# Patient Record
Sex: MALE | Race: WHITE | NOT HISPANIC OR LATINO | Employment: FULL TIME | URBAN - METROPOLITAN AREA
[De-identification: names, ages, dates, MRNs, and addresses within clinical notes are randomized per-mention and may not be internally consistent; named-entity substitution may affect disease eponyms.]

---

## 2017-01-11 ENCOUNTER — ALLSCRIPTS OFFICE VISIT (OUTPATIENT)
Dept: OTHER | Facility: OTHER | Age: 47
End: 2017-01-11

## 2017-05-30 ENCOUNTER — ALLSCRIPTS OFFICE VISIT (OUTPATIENT)
Dept: OTHER | Facility: OTHER | Age: 47
End: 2017-05-30

## 2017-06-09 ENCOUNTER — LAB CONVERSION - ENCOUNTER (OUTPATIENT)
Dept: OTHER | Facility: OTHER | Age: 47
End: 2017-06-09

## 2017-06-09 LAB
CHOLEST SERPL-MCNC: 187 MG/DL (ref 125–200)
CHOLEST/HDLC SERPL: 5.3 (CALC)
CREATININE, RANDOM URINE (HISTORICAL): 187 MG/DL (ref 20–370)
HBA1C MFR BLD HPLC: 6.3 % OF TOTAL HGB
HDLC SERPL-MCNC: 35 MG/DL
LDL CHOLESTEROL (HISTORICAL): 116 MG/DL (CALC)
MAGNESIUM, UR (HISTORICAL): 1.2 MG/DL
MICROALBUMIN/CREATININE RATIO (HISTORICAL): 6 MCG/MG CREAT
NON-HDL-CHOL (CHOL-HDL) (HISTORICAL): 152 MG/DL (CALC)
TRIGL SERPL-MCNC: 182 MG/DL
TSH SERPL DL<=0.05 MIU/L-ACNC: 1.11 MIU/L (ref 0.4–4.5)

## 2017-06-14 ENCOUNTER — ALLSCRIPTS OFFICE VISIT (OUTPATIENT)
Dept: OTHER | Facility: OTHER | Age: 47
End: 2017-06-14

## 2017-08-31 ENCOUNTER — GENERIC CONVERSION - ENCOUNTER (OUTPATIENT)
Dept: OTHER | Facility: OTHER | Age: 47
End: 2017-08-31

## 2017-09-21 ENCOUNTER — ALLSCRIPTS OFFICE VISIT (OUTPATIENT)
Dept: OTHER | Facility: OTHER | Age: 47
End: 2017-09-21

## 2017-12-11 ENCOUNTER — LAB CONVERSION - ENCOUNTER (OUTPATIENT)
Dept: OTHER | Facility: OTHER | Age: 47
End: 2017-12-11

## 2017-12-11 LAB
A/G RATIO (HISTORICAL): 1.3 (CALC) (ref 1–2.5)
ALBUMIN SERPL BCP-MCNC: 4 G/DL (ref 3.6–5.1)
ALP SERPL-CCNC: 71 U/L (ref 40–115)
ALT SERPL W P-5'-P-CCNC: 53 U/L (ref 9–46)
AST SERPL W P-5'-P-CCNC: 27 U/L (ref 10–40)
BASOPHILS # BLD AUTO: 0.5 %
BASOPHILS # BLD AUTO: 32 CELLS/UL (ref 0–200)
BILIRUB SERPL-MCNC: 0.9 MG/DL (ref 0.2–1.2)
BUN SERPL-MCNC: 11 MG/DL (ref 7–25)
BUN/CREA RATIO (HISTORICAL): ABNORMAL (CALC) (ref 6–22)
CALCIUM SERPL-MCNC: 9.2 MG/DL (ref 8.6–10.3)
CHLORIDE SERPL-SCNC: 99 MMOL/L (ref 98–110)
CHOLEST SERPL-MCNC: 184 MG/DL
CHOLEST/HDLC SERPL: 4.7 (CALC)
CO2 SERPL-SCNC: 30 MMOL/L (ref 20–31)
CREAT SERPL-MCNC: 0.8 MG/DL (ref 0.6–1.35)
DEPRECATED RDW RBC AUTO: 11.9 % (ref 11–15)
EGFR AFRICAN AMERICAN (HISTORICAL): 123 ML/MIN/1.73M2
EGFR-AMERICAN CALC (HISTORICAL): 106 ML/MIN/1.73M2
EOSINOPHIL # BLD AUTO: 290 CELLS/UL (ref 15–500)
EOSINOPHIL # BLD AUTO: 4.6 %
GAMMA GLOBULIN (HISTORICAL): 3.1 G/DL (CALC) (ref 1.9–3.7)
GLUCOSE (HISTORICAL): 158 MG/DL (ref 65–99)
HBA1C MFR BLD HPLC: 6.7 % OF TOTAL HGB
HCT VFR BLD AUTO: 46.5 % (ref 38.5–50)
HDLC SERPL-MCNC: 39 MG/DL
HGB BLD-MCNC: 16 G/DL (ref 13.2–17.1)
LDL CHOLESTEROL (HISTORICAL): 123 MG/DL (CALC)
LYMPHOCYTES # BLD AUTO: 1499 CELLS/UL (ref 850–3900)
LYMPHOCYTES # BLD AUTO: 23.8 %
MCH RBC QN AUTO: 31.3 PG (ref 27–33)
MCHC RBC AUTO-ENTMCNC: 34.4 G/DL (ref 32–36)
MCV RBC AUTO: 90.8 FL (ref 80–100)
MONOCYTES # BLD AUTO: 580 CELLS/UL (ref 200–950)
MONOCYTES (HISTORICAL): 9.2 %
NEUTROPHILS # BLD AUTO: 3900 CELLS/UL (ref 1500–7800)
NEUTROPHILS # BLD AUTO: 61.9 %
NON-HDL-CHOL (CHOL-HDL) (HISTORICAL): 145 MG/DL (CALC)
PLATELET # BLD AUTO: 248 THOUSAND/UL (ref 140–400)
PMV BLD AUTO: 9.6 FL (ref 7.5–12.5)
POTASSIUM SERPL-SCNC: 4 MMOL/L (ref 3.5–5.3)
PROSTATE SPECIFIC ANTIGEN TOTAL (HISTORICAL): 0.5 NG/ML
RBC # BLD AUTO: 5.12 MILLION/UL (ref 4.2–5.8)
SODIUM SERPL-SCNC: 136 MMOL/L (ref 135–146)
TOTAL PROTEIN (HISTORICAL): 7.1 G/DL (ref 6.1–8.1)
TRIGL SERPL-MCNC: 113 MG/DL
TSH SERPL DL<=0.05 MIU/L-ACNC: 1.55 MIU/L (ref 0.4–4.5)
WBC # BLD AUTO: 6.3 THOUSAND/UL (ref 3.8–10.8)

## 2017-12-14 DIAGNOSIS — E11.9 TYPE 2 DIABETES MELLITUS WITHOUT COMPLICATIONS (HCC): ICD-10-CM

## 2017-12-14 DIAGNOSIS — E78.5 HYPERLIPIDEMIA: ICD-10-CM

## 2017-12-14 DIAGNOSIS — J45.30 MILD PERSISTENT ASTHMA, UNCOMPLICATED: ICD-10-CM

## 2017-12-14 DIAGNOSIS — Z12.5 ENCOUNTER FOR SCREENING FOR MALIGNANT NEOPLASM OF PROSTATE: ICD-10-CM

## 2017-12-14 DIAGNOSIS — I10 ESSENTIAL (PRIMARY) HYPERTENSION: ICD-10-CM

## 2017-12-18 ENCOUNTER — ALLSCRIPTS OFFICE VISIT (OUTPATIENT)
Dept: OTHER | Facility: OTHER | Age: 47
End: 2017-12-18

## 2017-12-20 NOTE — PROGRESS NOTES
Assessment  1  Type 2 diabetes mellitus (250 00) (E11 9)   2  Hypertension (401 9) (I10)   3  Hyperlipidemia (272 4) (E78 5)    Plan  Hyperlipidemia, Hypertension, Type 2 diabetes mellitus    · (1) CBC/PLT/DIFF; Status:Active; Requested KPC:49JCW8821;    · (1) COMPREHENSIVE METABOLIC PANEL; Status:Active; Requested DFF:67UVI0994;    · (1) HEMOGLOBIN A1C; Status:Active; Requested PVK:66RWH4696;    · (1) LIPID PANEL FASTING W DIRECT LDL REFLEX; Status:Active; Requestedfor:18Jun2018;    · (1) MICROALBUMIN CREATININE RATIO, RANDOM URINE; Status:Active; Requestedfor:18Jun2018;    · (1) TSH WITH FT4 REFLEX; Status:Active; Requested MSO:74XYG1080;   Screening for diabetic retinopathy    · *VB - Eye Exam ; every 1 year; Last 01HSV7021; Next 42ZQN7193; Status:Active  Type 2 diabetes mellitus    · *VB - Foot Exam ; every 1 year; Last 47BTV9502; Next 28OGS2780; Status:Active    Discussion/Summary    1  Type 2 diabetes-presently stable with hemoglobin A1c of 6 7 however this has gone up from 6 3 last visit  We did discuss medication options and diet  If he continues to climb in the next 6 months he will be started on metformin therapy  2  Hypertension-presently stable on losartan with hydrochlorothiazide  His last several readings in the office have been in 120s over 80s  He was advised to keep a home log and if elevated contact me and we will initiate Norvasc for him  3  Hyperlipidemia-cholesterol numbers were reviewed and stable with diet control  Health maintenance-flu vaccine was declined by patient  Chief Complaint  pt is here for follow up of hyperlipidemia and hypertension   he also has blood work to review   cd      History of Present Illness  This is a 51-year-old gentleman that presents to the office for follow-up of chronic health conditions including type 2 diabetes, hypertension, and increased lipids  He has been feeling well without any acute complaints   He also continues to follow with allergist and has gone through with allergy injections  He does state that his allergist felt that his blood pressure was elevated recently at 130/80 and gave him a prescription for amlodipine 10 mg daily  He has not started taking it and is hesitant to have it filled  He is currently taking losartan with hydrochlorothiazide for his blood pressure  His diabetes has been diet controlled  Review of Systems   Constitutional: no fever,-- not feeling poorly,-- no chills-- and-- not feeling tired  Eyes: no eyesight problems-- and-- no purulent discharge from the eyes  ENT: no complaints of earache, no hearing loss, no nosebleeds, no nasal discharge, no sore throat, no hoarseness,-- no nosebleeds-- and-- no nasal discharge  Cardiovascular: the heart rate was not slow  Respiratory: no shortness of breath,-- no cough,-- no wheezing-- and-- no shortness of breath during exertion  Gastrointestinal: no abdominal pain-- and-- no constipation  Musculoskeletal: no arthralgias-- and-- no myalgias  Neurological: no headache  Hematologic/Lymphatic: no swollen glands-- and-- no swollen glands in the neck  Active Problems  1  Acute non-recurrent frontal sinusitis (461 1) (J01 10)   2  Allergic rhinitis (477 9) (J30 9)   3  Anxiety disorder (300 00) (F41 9)   4  Conjunctivitis (372 30) (H10 9)   5  Cough (786 2) (R05)   6  Depression (311) (F32 9)   7  Encounter for prostate cancer screening (V76 44) (Z12 5)   8  Eye problem (V41 1) (H57 9)   9  Hyperlipidemia (272 4) (E78 5)   10  Hypertension (401 9) (I10)   11  Mild persistent asthma (493 90) (J45 30)   12  Screening for diabetic retinopathy (V80 2) (Z13 5)   13  Sinusitis (473 9) (J32 9)   14  Type 2 diabetes mellitus (250 00) (E11 9)    Past Medical History    The active problems and past medical history were reviewed and updated today  Family History  Father    1  Family history of Coronary Artery Disease (V17 49)   2   Family history of Myocardial Infarction Arrhythmias  Family History    3  No family history of mental disorder    Social History   · Being A Social Drinker   · Denied: History of Drug Use   · Former smoker (V15 82) (I85 268)   · Marital History - Currently    · No drug use   · No secondhand smoke exposure (V49 89) (Z78 9)   · Occupation:    Current Meds   1  Breo Ellipta 100-25 MCG/INH Inhalation Aerosol Powder Breath Activated; ONE INHALATION DAILY  AFTER INHALATION RINSE MOUTH WITH WATER & SPIT  USE SAME TIME EACH DAY, NO MORE THAN 1 TIME IN 24 HOURS; Therapy: 53NCQ8915 to (Last Rx:12Jsz7907) Ordered   2  Claritin 10 MG Oral Capsule; Therapy: 97BWG5222 to Recorded   3  Fluticasone Propionate 50 MCG/ACT Nasal Suspension; USE 2 SPRAYS INTO EACH NOSTRIL EVERY DAY; Therapy: 36ECP5705 to (Evaluate:19Jan2018)  Requested for: 49SYQ2367; Last Rx:13Xwi9754 Ordered   4  Losartan Potassium-HCTZ 100-25 MG Oral Tablet; take 1 tablet by mouth every day; Therapy: 06HRX8988 to (Evaluate:09Nkf0491)  Requested for: 02UNC3400; Last Rx:02Pei1653 Ordered   5  Singulair 10 MG Oral Tablet; Therapy: 81YTP5139 to (Last Rx:75Mea5352)  Requested for: 80PBW5207 Ordered    The medication list was reviewed and updated today  Allergies  1  Nasonex SUSP    Vitals  Vital Signs    Recorded: 33NPC2220 05:52PM   Heart Rate 76, L Radial   Pulse Quality Regular, L Radial   Systolic 447, LUE, Sitting   Diastolic 78, LUE, Sitting   Height 6 ft 1 8 in   Weight 247 lb    BMI Calculated 31 89   BSA Calculated 2 37     Physical Exam   Constitutional  General appearance: No acute distress, well appearing and well nourished  Eyes  Conjunctiva and lids: No swelling, erythema, or discharge  Pupils and irises: Equal, round and reactive to light  Ears, Nose, Mouth, and Throat  External inspection of ears and nose: Normal    Otoscopic examination: Tympanic membrance translucent with normal light reflex  Canals patent without erythema     Nasal mucosa, septum, and turbinates: Normal without edema or erythema  Oropharynx: Normal with no erythema, edema, exudate or lesions  Pulmonary  Respiratory effort: No increased work of breathing or signs of respiratory distress  Auscultation of lungs: Clear to auscultation, equal breath sounds bilaterally, no wheezes, no rales, no rhonci  Cardiovascular  Auscultation of heart: Normal rate and rhythm, normal S1 and S2, without murmurs  Examination of extremities for edema and/or varicosities: Normal    Abdomen  Abdomen: Non-tender, no masses  Liver and spleen: No hepatomegaly or splenomegaly  Lymphatic  Palpation of lymph nodes in neck: No lymphadenopathy  Musculoskeletal  Gait and station: Normal    Digits and nails: Normal without clubbing or cyanosis  Neurologic  Reflexes: 2+ and symmetric  Sensation: No sensory loss  Psychiatric  Orientation to person, place and time: Normal    Mood and affect: Normal          Results/Data  PHQ-9 Adult Depression Screening 35JOC5375 05:58PM User, s     Test Name Result Flag Reference   PHQ-9 Adult Depression Score 0     Over the last two weeks, how often have you been bothered by any of the following problems? Little interest or pleasure in doing things: Not at all - 0 Feeling down, depressed, or hopeless: Not at all - 0 Trouble falling or staying asleep, or sleeping too much: Not at all - 0 Feeling tired or having little energy: Not at all - 0 Poor appetite or over eating: Not at all - 0 Feeling bad about yourself - or that you are a failure or have let yourself or your family down: Not at all - 0 Trouble concentrating on things, such as reading the newspaper or watching television: Not at all - 0 Moving or speaking so slowly that other people could have noticed   Or the opposite -  being so fidgety or restless that you have been moving around a lot more than usual: Not at all - 0 Thoughts that you would be better off dead, or of hurting yourself in some way: Not at all - 0   PHQ-9 Adult Depression Screening Negative     PHQ-9 Difficulty Level Not difficult at all     PHQ-9 Severity No Depression       PHQ-2 Adult Depression Screening 98KWF4517 05:58PM User, Chelita     Test Name Result Flag Reference   PHQ-2 Adult Depression Score 0     Over the last two weeks, how often have you been bothered by any of the following problems? Little interest or pleasure in doing things: Not at all - 0 Feeling down, depressed, or hopeless: Not at all - 0   PHQ-2 Adult Depression Screening Negative       Diabetes Flow Sheet 71TVQ1837 05:57PM      Test Name Result Flag Reference   Foot Exam 06DGY4182         Health Management  Screening for diabetic retinopathy   *VB - Eye Exam; every 1 year; Last 68Cvv2877; Next Due: 23NYO5854; Active  Type 2 diabetes mellitus   (1) HEMOGLOBIN A1C; every 6 months; Last 18HCL1290; Next Due: 35WYR1406; Active  (1) MICROALBUMIN CREATININE RATIO, RANDOM URINE; every 1 year; Last 27ZHB5824; Next IYQ:33MTW4507; Active  *VB - Foot Exam; every 1 year; Last 95KKH6269; Next Due: 57MKX4512;  Overdue    Signatures   Electronically signed by : Rober Reyes, [facility]; Dec 18 2017  6:23PM EST                       (Author)    Electronically signed by : Chad Everett DO; Dec 19 2017  7:19AM EST

## 2018-01-09 NOTE — PROGRESS NOTES
Chief Complaint  PT PRESENTS FOR ALLERGY INJECTION PER DR MCDOWELL'S OFFICE  0 6CC OF ALLERGY EXTRACT WAS ADMINISTERED SUBCUTANEOUSLY TO POSTERIOR UPPER (R) ARM  TOLERATED WITHOUT DIFFICULTY  AFTER 20 MINUTES NO REACTION WAS NOTED AND PT LEFT THE OFFICE IN NO DISTRESS --BB      Active Problems    1  Allergic rhinitis (477 9) (J30 9)   2  Anxiety disorder (300 00) (F41 9)   3  Conjunctivitis (372 30) (H10 9)   4  Cough (786 2) (R05)   5  Depression (311) (F32 9)   6  Encounter for prostate cancer screening (V76 44) (Z12 5)   7  Eye problem (V41 1) (H57 9)   8  Hyperlipidemia (272 4) (E78 5)   9  Hypertension (401 9) (I10)   10  Mild persistent asthma (493 90) (J45 30)   11  Screening for diabetic retinopathy (V80 2) (Z13 5)   12  Type 2 diabetes mellitus (250 00) (E11 9)    Current Meds   1  Advair Diskus 250-50 MCG/DOSE Inhalation Aerosol Powder Breath Activated; inhale 1   puff twice daily; Therapy: 39Pzp5639 to (Last Rx:95Wje3667)  Requested for: 23Rnu0484 Ordered   2  Breo Ellipta 100-25 MCG/INH Inhalation Aerosol Powder Breath Activated; ONE   INHALATION DAILY  AFTER INHALATION RINSE MOUTH WITH WATER & SPIT  USE   SAME TIME EACH DAY, NO MORE THAN 1 TIME IN 24 HOURS; Therapy: 70EYM1133 to (Last Rx:23May2016) Ordered   3  Fluticasone Propionate 50 MCG/ACT Nasal Suspension; USE 2 SPRAYS INTO EACH   NOSTRIL EVERY DAY; Therapy: 09QUO5179 to (Mack [place]ar[place])  Requested for: 38KTO0197; Last   Rx:23May2016 Ordered   4  Losartan Potassium-HCTZ 100-25 MG Oral Tablet (Hyzaar); TAKE 1 TABLET BY MOUTH   EVERY DAY; Therapy: 56FHG0580 to (Evaluate:77Wym4856)  Requested for: 08TDX8481; Last   Rx:23May2016 Ordered   5  Singulair 10 MG Oral Tablet (Montelukast Sodium); Therapy: 23HVW5760 to (Last Rx:28Orc6123)  Requested for: 80SPC3225 Ordered   6  Sulfacetamide Sodium 10 % Ophthalmic Solution; 1 - 2 drops in each eye 4 times a day; Therapy: 88CHU9577 to (Last Rx:17Jun2016)  Requested for: 25SFC7515 Ordered   7  Tobramycin 0 3 % Ophthalmic Solution; INSTILL 1 DROP INTO AFFECTED EYE(S) 4   TIMES DAILY; Therapy: 87RAJ8476 to (Last Rx:06Jun2016)  Requested for: 06Jun2016 Ordered    Allergies    1  Nasonex SUSP    Results/Data  Allergy Injection Flow Sheet 64ZGS7979 05:42PM [place]     Test Name Result Flag Reference   Allergy Injection #1      VIAL "G-RW-M(7)-MM" 0 6CC   Allergy Injection #1 Site      POSTERIOR UPPER (R) ARM   Allergy Inj #1 Reaction NONE         Plan  Allergic rhinitis    · Allergy Injection Flow Sheet; Status:Complete - Retrospective By Protocol Authorization;    Done: 25OPQ7137 05:42PM    Future Appointments    Date/Time Provider Specialty Site   11/28/2016 06:00 PM Sven Richards [facility]     Signatures   Electronically signed by : Dusty Garcia, ; Jul 13 2016  5:44PM EST                       (Author)    Electronically signed by :  CALLUM Clay ; Jul 18 2016  8:12AM EST

## 2018-01-09 NOTE — PROGRESS NOTES
Chief Complaint  Pt  here for allergy injection  0 60 cc of G-RW-M7) -MM given to pt  in left upper arm  Pt  handled procedure well  Area checked after 20 min, no reaction noted and pt  left in no distress  kck      Active Problems    1  Allergic rhinitis (477 9) (J30 9)   2  Anxiety disorder (300 00) (F41 9)   3  Conjunctivitis (372 30) (H10 9)   4  Cough (786 2) (R05)   5  Depression (311) (F32 9)   6  Encounter for prostate cancer screening (V76 44) (Z12 5)   7  Eye problem (V41 1) (H57 9)   8  Hyperlipidemia (272 4) (E78 5)   9  Hypertension (401 9) (I10)   10  Mild persistent asthma (493 90) (J45 30)   11  Screening for diabetic retinopathy (V80 2) (Z13 5)   12  Type 2 diabetes mellitus (250 00) (E11 9)    Current Meds   1  Advair Diskus 250-50 MCG/DOSE Inhalation Aerosol Powder Breath Activated; inhale 1   puff twice daily; Therapy: 71Uss6302 to (Last Rx:44Ywd2197)  Requested for: 62Yvi8475 Ordered   2  Breo Ellipta 100-25 MCG/INH Inhalation Aerosol Powder Breath Activated; ONE   INHALATION DAILY  AFTER INHALATION RINSE MOUTH WITH WATER & SPIT  USE   SAME TIME EACH DAY, NO MORE THAN 1 TIME IN 24 HOURS; Therapy: 85FDJ2808 to (Last Rx:23May2016) Ordered   3  Fluticasone Propionate 50 MCG/ACT Nasal Suspension; USE 2 SPRAYS INTO EACH   NOSTRIL EVERY DAY; Therapy: 88KWW0676 to (Sary Gómez)  Requested for: 07HPQ7874; Last   Rx:12Tpc2744 Ordered   4  Losartan Potassium-HCTZ 100-25 MG Oral Tablet; TAKE 1 TABLET BY MOUTH EVERY   DAY; Therapy: 74ONN0720 to (Evaluate:46Gww0121)  Requested for: 87RWE7666; Last   Rx:43Xld2988 Ordered   5  Singulair 10 MG Oral Tablet; Therapy: 23QKZ0318 to (Last Rx:16May2011)  Requested for: 20KDH8139 Ordered   6  Sulfacetamide Sodium 10 % Ophthalmic Solution; 1 - 2 drops in each eye 4 times a day; Therapy: 69QWX4256 to (Last Rx:42Enl6957)  Requested for: 64CMV5885 Ordered   7   Tobramycin 0 3 % Ophthalmic Solution; INSTILL 1 DROP INTO AFFECTED EYE(S) 4   TIMES DAILY; Therapy: 43LJA1122 to (Last Rx:06Jun2016)  Requested for: 06Jun2016 Ordered    Allergies    1   Nasonex SUSP    Plan  Allergic rhinitis    · Allergy Injection single injection; Status:Active - Perform Order; Requested  [facility]:76VPF9431;     Future Appointments    Date/Time Provider Specialty Site   11/28/2016 06:00 PM Cristino Galicia [facility]     Signatures   Electronically signed by : Lady Lam MD; Sep 11 2016  4:10PM EST                       (Author)

## 2018-01-12 VITALS
SYSTOLIC BLOOD PRESSURE: 118 MMHG | RESPIRATION RATE: 16 BRPM | HEART RATE: 76 BPM | HEIGHT: 74 IN | DIASTOLIC BLOOD PRESSURE: 72 MMHG | WEIGHT: 248.38 LBS | BODY MASS INDEX: 31.88 KG/M2

## 2018-01-14 VITALS
DIASTOLIC BLOOD PRESSURE: 66 MMHG | BODY MASS INDEX: 31.65 KG/M2 | WEIGHT: 246.6 LBS | HEIGHT: 74 IN | SYSTOLIC BLOOD PRESSURE: 120 MMHG | TEMPERATURE: 97.9 F | HEART RATE: 96 BPM

## 2018-01-22 VITALS
DIASTOLIC BLOOD PRESSURE: 78 MMHG | SYSTOLIC BLOOD PRESSURE: 130 MMHG | BODY MASS INDEX: 31.7 KG/M2 | HEIGHT: 74 IN | WEIGHT: 247 LBS | HEART RATE: 76 BPM

## 2018-02-08 ENCOUNTER — CLINICAL SUPPORT (OUTPATIENT)
Dept: FAMILY MEDICINE CLINIC | Facility: CLINIC | Age: 48
End: 2018-02-08
Payer: COMMERCIAL

## 2018-02-08 DIAGNOSIS — J30.9 ALLERGIC RHINITIS, UNSPECIFIED CHRONICITY, UNSPECIFIED SEASONALITY, UNSPECIFIED TRIGGER: ICD-10-CM

## 2018-02-08 PROCEDURE — 95115 IMMUNOTHERAPY ONE INJECTION: CPT | Performed by: FAMILY MEDICINE

## 2018-03-08 ENCOUNTER — CLINICAL SUPPORT (OUTPATIENT)
Dept: FAMILY MEDICINE CLINIC | Facility: CLINIC | Age: 48
End: 2018-03-08
Payer: COMMERCIAL

## 2018-03-08 DIAGNOSIS — J30.9 ALLERGIC RHINITIS, UNSPECIFIED CHRONICITY, UNSPECIFIED SEASONALITY, UNSPECIFIED TRIGGER: ICD-10-CM

## 2018-03-08 PROCEDURE — 95117 IMMUNOTHERAPY INJECTIONS: CPT | Performed by: FAMILY MEDICINE

## 2018-03-09 ENCOUNTER — TRANSCRIBE ORDERS (OUTPATIENT)
Dept: FAMILY MEDICINE CLINIC | Facility: CLINIC | Age: 48
End: 2018-03-09

## 2018-03-09 DIAGNOSIS — J30.1 CHRONIC SEASONAL ALLERGIC RHINITIS DUE TO POLLEN: Primary | ICD-10-CM

## 2018-03-09 NOTE — PROGRESS NOTES
Pt here for allergy shot was given to him in [place] , Pt tolerated well, Left office after 20 min  In no distress

## 2018-04-05 ENCOUNTER — CLINICAL SUPPORT (OUTPATIENT)
Dept: FAMILY MEDICINE CLINIC | Facility: CLINIC | Age: 48
End: 2018-04-05
Payer: COMMERCIAL

## 2018-04-05 DIAGNOSIS — J30.9 ALLERGIC RHINITIS, UNSPECIFIED CHRONICITY, UNSPECIFIED SEASONALITY, UNSPECIFIED TRIGGER: ICD-10-CM

## 2018-04-05 PROCEDURE — 95115 IMMUNOTHERAPY ONE INJECTION: CPT | Performed by: FAMILY MEDICINE

## 2018-04-26 ENCOUNTER — CLINICAL SUPPORT (OUTPATIENT)
Dept: FAMILY MEDICINE CLINIC | Facility: CLINIC | Age: 48
End: 2018-04-26
Payer: COMMERCIAL

## 2018-04-26 ENCOUNTER — TELEPHONE (OUTPATIENT)
Dept: FAMILY MEDICINE CLINIC | Facility: CLINIC | Age: 48
End: 2018-04-26

## 2018-04-26 DIAGNOSIS — J30.1 ALLERGIC RHINITIS DUE TO POLLEN, UNSPECIFIED SEASONALITY: Primary | ICD-10-CM

## 2018-04-26 DIAGNOSIS — J30.9 ALLERGIC RHINITIS, UNSPECIFIED SEASONALITY, UNSPECIFIED TRIGGER: ICD-10-CM

## 2018-04-26 PROCEDURE — 95115 IMMUNOTHERAPY ONE INJECTION: CPT

## 2018-04-26 RX ORDER — LOSARTAN POTASSIUM AND HYDROCHLOROTHIAZIDE 25; 100 MG/1; MG/1
1 TABLET ORAL DAILY
COMMUNITY
Start: 2014-03-24 | End: 2018-07-12 | Stop reason: SDUPTHER

## 2018-04-26 RX ORDER — MONTELUKAST SODIUM 10 MG/1
TABLET ORAL
Refills: 11 | COMMUNITY
Start: 2018-04-10

## 2018-04-26 RX ORDER — FLUTICASONE PROPIONATE 50 MCG
2 SPRAY, SUSPENSION (ML) NASAL DAILY
Qty: 16 G | Refills: 3 | Status: SHIPPED | OUTPATIENT
Start: 2018-04-26 | End: 2018-07-30 | Stop reason: SDUPTHER

## 2018-04-26 RX ORDER — FLUTICASONE FUROATE AND VILANTEROL 100; 25 UG/1; UG/1
POWDER RESPIRATORY (INHALATION) DAILY
COMMUNITY
Start: 2016-05-23

## 2018-04-26 RX ORDER — FLUTICASONE PROPIONATE 50 MCG
2 SPRAY, SUSPENSION (ML) NASAL DAILY
COMMUNITY
Start: 2012-04-24 | End: 2018-04-26 | Stop reason: SDUPTHER

## 2018-04-26 NOTE — PROGRESS NOTES
Pt is here for an allergy injection today  Patient states he is leaving for [place] tomorrow and is going to stay with his son "for awhile and see how it goes" and might end up staying there  I copied his sheet for him and kept the original in case he comes back  Patient tolerated the allergy shot well

## 2018-06-18 DIAGNOSIS — E78.5 HYPERLIPIDEMIA: ICD-10-CM

## 2018-06-18 DIAGNOSIS — I10 ESSENTIAL (PRIMARY) HYPERTENSION: ICD-10-CM

## 2018-06-18 DIAGNOSIS — E11.9 TYPE 2 DIABETES MELLITUS WITHOUT COMPLICATIONS (HCC): ICD-10-CM

## 2018-07-12 DIAGNOSIS — I10 BENIGN ESSENTIAL HYPERTENSION: Primary | ICD-10-CM

## 2018-07-13 RX ORDER — LOSARTAN POTASSIUM AND HYDROCHLOROTHIAZIDE 25; 100 MG/1; MG/1
TABLET ORAL
Qty: 90 TABLET | Refills: 3 | Status: SHIPPED | OUTPATIENT
Start: 2018-07-13

## 2018-07-30 DIAGNOSIS — J30.1 ALLERGIC RHINITIS DUE TO POLLEN, UNSPECIFIED SEASONALITY: ICD-10-CM

## 2018-07-30 RX ORDER — FLUTICASONE PROPIONATE 50 MCG
2 SPRAY, SUSPENSION (ML) NASAL DAILY
Qty: 16 G | Refills: 1 | OUTPATIENT
Start: 2018-07-30 | End: 2018-10-20 | Stop reason: SDUPTHER

## 2018-10-20 DIAGNOSIS — J30.1 ALLERGIC RHINITIS DUE TO POLLEN, UNSPECIFIED SEASONALITY: ICD-10-CM

## 2018-10-20 RX ORDER — FLUTICASONE PROPIONATE 50 MCG
SPRAY, SUSPENSION (ML) NASAL
Qty: 16 G | Refills: 1 | Status: SHIPPED | OUTPATIENT
Start: 2018-10-20 | End: 2018-11-03 | Stop reason: SDUPTHER

## 2018-11-03 DIAGNOSIS — J30.1 ALLERGIC RHINITIS DUE TO POLLEN, UNSPECIFIED SEASONALITY: ICD-10-CM

## 2018-11-05 RX ORDER — FLUTICASONE PROPIONATE 50 MCG
SPRAY, SUSPENSION (ML) NASAL
Qty: 1 BOTTLE | Refills: 1 | Status: SHIPPED | OUTPATIENT
Start: 2018-11-05

## 2019-08-18 DIAGNOSIS — I10 BENIGN ESSENTIAL HYPERTENSION: ICD-10-CM

## 2019-08-18 RX ORDER — LOSARTAN POTASSIUM AND HYDROCHLOROTHIAZIDE 25; 100 MG/1; MG/1
TABLET ORAL
Qty: 90 TABLET | Refills: 3 | OUTPATIENT
Start: 2019-08-18